# Patient Record
Sex: FEMALE | Race: WHITE
[De-identification: names, ages, dates, MRNs, and addresses within clinical notes are randomized per-mention and may not be internally consistent; named-entity substitution may affect disease eponyms.]

---
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
## 2021-02-13 ENCOUNTER — HOSPITAL ENCOUNTER (EMERGENCY)
Dept: HOSPITAL 76 - ED | Age: 21
LOS: 1 days | Discharge: HOME | End: 2021-02-14
Payer: COMMERCIAL

## 2021-02-13 DIAGNOSIS — S21.119A: ICD-10-CM

## 2021-02-13 DIAGNOSIS — S51.812A: ICD-10-CM

## 2021-02-13 DIAGNOSIS — K92.1: ICD-10-CM

## 2021-02-13 DIAGNOSIS — Z83.79: ICD-10-CM

## 2021-02-13 DIAGNOSIS — S11.91XA: ICD-10-CM
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
## 2021-02-13 DIAGNOSIS — F33.2: Primary | ICD-10-CM

## 2021-02-13 DIAGNOSIS — Z20.822: ICD-10-CM

## 2021-02-13 DIAGNOSIS — R45.851: ICD-10-CM

## 2021-02-13 DIAGNOSIS — R10.12: ICD-10-CM

## 2021-02-13 DIAGNOSIS — F41.9: ICD-10-CM

## 2021-02-13 LAB
ALBUMIN DIAFP-MCNC: 4.9 G/DL (ref 3.2–5.5)
ALBUMIN/GLOB SERPL: 1.5 {RATIO} (ref 1–2.2)
ALP SERPL-CCNC: 45 IU/L (ref 42–121)
ALT SERPL W P-5'-P-CCNC: 16 IU/L (ref 10–60)
AMPHET UR QL SCN: NEGATIVE
ANION GAP SERPL CALCULATED.4IONS-SCNC: 13 MMOL/L (ref 6–13)
APAP SERPL-MCNC: < 10 UG/ML (ref 10–30)
AST SERPL W P-5'-P-CCNC: 20 IU/L (ref 10–42)
BASOPHILS NFR BLD AUTO: 0 10^3/UL (ref 0–0.1)
BASOPHILS NFR BLD AUTO: 0.3 %
BENZODIAZ UR QL SCN: NEGATIVE
BILIRUB BLD-MCNC: 0.9 MG/DL (ref 0.2–1)
BUN SERPL-MCNC: 18 MG/DL (ref 6–20)
C PNEUM DNA NPH QL NAA+NON-PROBE: NOT DETECTED
CALCIUM UR-MCNC: 9.8 MG/DL (ref 8.5–10.3)
CHLORIDE SERPL-SCNC: 99 MMOL/L (ref 101–111)
CLARITY UR REFRACT.AUTO: CLEAR
CO2 SERPL-SCNC: 27 MMOL/L (ref 21–32)
COCAINE UR-SCNC: NEGATIVE UMOL/L
CREAT SERPLBLD-SCNC: 1 MG/DL (ref 0.4–1)
EOSINOPHIL # BLD AUTO: 0.1 10^3/UL (ref 0–0.7)
EOSINOPHIL NFR BLD AUTO: 0.9 %
ERYTHROCYTE [DISTWIDTH] IN BLOOD BY AUTOMATED COUNT: 11.4 % (ref 12–15)
GLOBULIN SER-MCNC: 3.3 G/DL (ref 2.1–4.2)
GLUCOSE SERPL-MCNC: 92 MG/DL (ref 70–100)
GLUCOSE UR QL STRIP.AUTO: NEGATIVE MG/DL
HCG UR QL: NEGATIVE
HGB UR QL STRIP: 14 G/DL (ref 12–16)
KETONES UR QL STRIP.AUTO: NEGATIVE MG/DL
LIPASE SERPL-CCNC: 31 U/L (ref 22–51)
LYMPHOCYTES # SPEC AUTO: 1.8 10^3/UL (ref 1.5–3.5)
LYMPHOCYTES NFR BLD AUTO: 19.8 %
MCH RBC QN AUTO: 32.2 PG (ref 27–31)
MCHC RBC AUTO-ENTMCNC: 33.3 G/DL (ref 32–36)
MCV RBC AUTO: 96.8 FL (ref 81–99)
METHADONE UR QL SCN: NEGATIVE
METHAMPHET UR QL SCN: NEGATIVE
MONOCYTES # BLD AUTO: 0.5 10^3/UL (ref 0–1)
MONOCYTES NFR BLD AUTO: 5.1 %
NEUTROPHILS # BLD AUTO: 6.6 10^3/UL (ref 1.5–6.6)
NEUTROPHILS # SNV AUTO: 8.9 X10^3/UL (ref 4.8–10.8)
NEUTROPHILS NFR BLD AUTO: 73.8 %
NITRITE UR QL STRIP.AUTO: NEGATIVE
OPIATES UR QL SCN: NEGATIVE
PDW BLD AUTO: 9.6 FL (ref 7.9–10.8)
PH UR STRIP.AUTO: 7 PH (ref 5–7.5)
PLATELET # BLD: 252 10^3/UL (ref 130–450)
PROT SPEC-MCNC: 8.2 G/DL (ref 6.7–8.2)
PROT UR STRIP.AUTO-MCNC: NEGATIVE MG/DL
RBC # UR STRIP.AUTO: NEGATIVE /UL
RBC MAR: 4.35 10^6/UL (ref 4.2–5.4)
SALICYLATES SERPL-MCNC: < 6 MG/DL
SP GR UR STRIP.AUTO: 1.01 (ref 1–1.03)
UROBILINOGEN UR QL STRIP.AUTO: (no result) E.U./DL
UROBILINOGEN UR STRIP.AUTO-MCNC: NEGATIVE MG/DL
VOLATILE DRUGS POS SERPL SCN: (no result)

## 2021-02-13 PROCEDURE — 83690 ASSAY OF LIPASE: CPT

## 2021-02-13 PROCEDURE — 83735 ASSAY OF MAGNESIUM: CPT

## 2021-02-13 PROCEDURE — 85025 COMPLETE CBC W/AUTO DIFF WBC: CPT

## 2021-02-13 PROCEDURE — 81003 URINALYSIS AUTO W/O SCOPE: CPT

## 2021-02-13 PROCEDURE — 80329 ANALGESICS NON-OPIOID 1 OR 2: CPT

## 2021-02-13 PROCEDURE — 84443 ASSAY THYROID STIM HORMONE: CPT

## 2021-02-13 PROCEDURE — 80320 DRUG SCREEN QUANTALCOHOLS: CPT

## 2021-02-13 PROCEDURE — 80053 COMPREHEN METABOLIC PANEL: CPT

## 2021-02-13 PROCEDURE — 81001 URINALYSIS AUTO W/SCOPE: CPT

## 2021-02-13 PROCEDURE — 0202U NFCT DS 22 TRGT SARS-COV-2: CPT

## 2021-02-13 PROCEDURE — 99284 EMERGENCY DEPT VISIT MOD MDM: CPT

## 2021-02-13 PROCEDURE — 93005 ELECTROCARDIOGRAM TRACING: CPT

## 2021-02-13 PROCEDURE — 80307 DRUG TEST PRSMV CHEM ANLYZR: CPT

## 2021-02-13 PROCEDURE — 81025 URINE PREGNANCY TEST: CPT

## 2021-02-13 PROCEDURE — 74177 CT ABD & PELVIS W/CONTRAST: CPT

## 2021-02-13 PROCEDURE — 87086 URINE CULTURE/COLONY COUNT: CPT

## 2021-02-13 PROCEDURE — 36415 COLL VENOUS BLD VENIPUNCTURE: CPT

## 2021-02-13 PROCEDURE — 80306 DRUG TEST PRSMV INSTRMNT: CPT

## 2021-02-13 NOTE — ED PHYSICIAN DOCUMENTATION
History of Present Illness





- Stated complaint


Stated Complaint: BLOOD IN STOOL





- Chief complaint


Chief Complaint: Abd Pain





- Additonal information


Additional information: 


20-year-old female presents to the emergency department with 2 concerns.





Her first concern of priority is that she noticed bright red bloody stools this 

morning.  She denies that she had painful bowel movements.  She denies that it 

was blood-streaked stool.  She is concerned because both her mom and sisters 

have a history of Crohn's/colitis and she is concerned that this may be the on 

set of this condition.  She has no fevers, vomiting.  Denies urinary symptoms.  

No pertinent past surgical history.  She denies any history of bloody stools.  

Patient denies aspirin or NSAID use.  She does drink occasionally and has been 

drinking more heavily than normal recently. preceding this a.m.





Her second concern is worsening depression.  She reports that 2 nights ago she 

cut her left arm extensively.  She has suicidal thoughts but denies intent of 

self-harm at this time.  After cutting herself 2 nights ago she did reach out to

her therapist And stated that she felt a little better after cutting but she 

expresses that she feels generally fatigued disinterested in life and 

disassociated with her body.  She has been using cannabis oil as well as 

occasionally smoking to help control her symptoms.  She has never been medicated

for her depression or anxiety.  She does have an appointment with her talk 

therapist this upcoming Monday but is interested in the possibility of starting 

oral medications.





She does endorse a history of alcohol use but has been trying not to drink.  She

reports that when she cut herself 2 nights ago she had been drinking heavily.





Pt does not have an established PCP








Review of Systems


Constitutional: denies: Fever, Chills


Eyes: reports: Reviewed and negative


Ears: reports: Reviewed and negative


Nose: reports: Reviewed and negative


Throat: reports: Reviewed and negative


Cardiac: reports: Reviewed and negative


Respiratory: reports: Reviewed and negative


GI: reports: Abdominal Pain, Nausea, Bloody / black stool.  denies: Vomiting, 

Constipation, Diarrhea


: denies: Dysuria, Frequency, Hesitancy


Skin: reports: Lesions (Multiple shallow cutting lesions in various stages of 

healing on the left forearm chest and left neck.).  denies: Rash


Musculoskeletal: reports: Reviewed and negative


Neurologic: reports: Reviewed and negative


Psychiatric: reports: Depressed, Anxiety.  denies: Suicidal (Suicidal thoughts 

but no active plan.), Homicidal, Hallucinations, Insomnia


Endocrine: reports: Reviewed and negative





PD PAST MEDICAL HISTORY





- Past Surgical History


Past Surgical History: No





- Present Medications


Home Medications: 


                                Ambulatory Orders











 Medication  Instructions  Recorded  Confirmed


 


Pantoprazole [Protonix] 0 mg PO AC #30 02/13/21 














- Allergies


Allergies/Adverse Reactions: 


                                    Allergies











Allergy/AdvReac Type Severity Reaction Status Date / Time


 


No Known Drug Allergies Allergy   Verified 02/13/21 12:30














- Social History


Does the pt smoke?: No


Smoking Status: Never smoker


Does the pt drink ETOH?: No


Does the pt have substance abuse?: No





PD ED PE EXPANDED





- General


General: Alert, No acute distress, Well developed/nourished





- HEENT


HEENT: Atraumatic, PERRL





- Neck


Neck: Supple w/out meningeal sx, Other (multiple shallow cutting lesion left 

neck).  No: Adenopathy





- Cardiac


Cardiac: Regular Rate, Regular Rhythm, Radial strong equal, Pedal strong equal, 

Cap refill < 2 sec





- Respiratory


Respiratory: Clear to ausultation elida.  No: Distress, Labored





- Abdomen


Abdomen: Normal Bowel sounds, Tender to palpation (LUQ without guarding or 

rebound)





- Rectal


Rectal: Normal Tone, Chaperone present, Other (Digital rectal exam revealed a 

small amount of lul hematochezia)





- Derm


Derm: Normal color, Warm and dry, Other (Multiple shallow cutting lesions left 

forearm neck and chest in various stages of healing.  No surrounding erythema 

drainage or signs of infection.).  No: Pale





- Neuro


Neuro: Alert and Oriented X 3, CNII-XII intact





- GCS


Eye Opening: Spontaneous


Motor: Obeys Commands


Verbal: Oriented


Total: 15





- Psych


Psych: Anxious.  No: Suicidal, Homicidal





Results





- Vitals


Vitals: 


                               Vital Signs - 24 hr











  02/13/21 02/13/21 02/13/21





  12:25 13:18 15:40


 


Temperature 36.5 C 37.0 C 36.8 C


 


Heart Rate 63 48 L 55 L


 


Respiratory 16 18 13





Rate   


 


Blood Pressure 123/82 H 114/80 114/74


 


O2 Saturation 100 99 100














  02/13/21 02/13/21





  17:48 19:30


 


Temperature 36.8 C 37.2 C


 


Heart Rate 71 57 L


 


Respiratory 12 16





Rate  


 


Blood Pressure 106/65 126/85 H


 


O2 Saturation 100 100








                                     Oxygen











O2 Source                      Room air

















- Labs


Labs: 


                                Laboratory Tests











  02/13/21 02/13/21 02/13/21





  13:00 13:00 13:13


 


WBC    8.9


 


RBC    4.35


 


Hgb    14.0


 


Hct    42.1


 


MCV    96.8


 


MCH    32.2 H


 


MCHC    33.3


 


RDW    11.4 L


 


Plt Count    252


 


MPV    9.6


 


Neut # (Auto)    6.6


 


Lymph # (Auto)    1.8


 


Mono # (Auto)    0.5


 


Eos # (Auto)    0.1


 


Baso # (Auto)    0.0


 


Absolute Nucleated RBC    0.00


 


Nucleated RBC %    0.0


 


Sodium   


 


Potassium   


 


Chloride   


 


Carbon Dioxide   


 


Anion Gap   


 


BUN   


 


Creatinine   


 


Estimated GFR (MDRD)   


 


Glucose   


 


Calcium   


 


Magnesium   


 


Total Bilirubin   


 


AST   


 


ALT   


 


Alkaline Phosphatase   


 


Total Protein   


 


Albumin   


 


Globulin   


 


Albumin/Globulin Ratio   


 


Lipase   


 


TSH   


 


Urine Color  YELLOW  


 


Urine Clarity  CLEAR  


 


Urine pH  7.0  


 


Ur Specific Gravity  1.010  


 


Urine Protein  NEGATIVE  


 


Urine Glucose (UA)  NEGATIVE  


 


Urine Ketones  NEGATIVE  


 


Urine Occult Blood  NEGATIVE  


 


Urine Nitrite  NEGATIVE  


 


Urine Bilirubin  NEGATIVE  


 


Urine Urobilinogen  0.2 (NORMAL)  


 


Ur Leukocyte Esterase  NEGATIVE  


 


Ur Microscopic Review  NOT INDICATED  


 


Urine Culture Comments  NOT INDICATED  


 


Urine HCG, Qual   NEGATIVE 


 


Nasal Adenovirus (PCR)   


 


Nasal B. parapertussis DNA (PCR)   


 


Nasal Coronavir 229E PCR   


 


Nasal Coronavir HKU1 PCR   


 


Nasal Coronavir NL63 PCR   


 


Nasal Coronavir OC43 PCR   


 


Nasal Enterovir/Rhinovir PCR   


 


Nasal Influenza B PCR   


 


Nasal Influenza A PCR   


 


Nasal Parainfluen 1 PCR   


 


Nasal Parainfluen 2 PCR   


 


Nasal Parainfluen 3 PCR   


 


Nasal Parainfluen 4 PCR   


 


Nasal RSV (PCR)   


 


Nasal B.pertussis DNA PCR   


 


Nasal C.pneumoniae (PCR)   


 


Corona Human Metapneumo PCR   


 


Nasal M.pneumoniae (PCR)   


 


Nasal SARS-CoV-2 (PCR)   


 


Salicylates   


 


Urine Opiates Screen   NEGATIVE 


 


Ur Oxycodone Screen   NEGATIVE 


 


Urine Methadone Screen   NEGATIVE 


 


Ur Propoxyphene Screen   NEGATIVE 


 


Acetaminophen   


 


Ur Barbiturates Screen   NEGATIVE 


 


Ur Tricyclics Screen   NEGATIVE 


 


Ur Phencyclidine Scrn   NEGATIVE 


 


Ur Amphetamine Screen   NEGATIVE 


 


U Methamphetamines Scrn   NEGATIVE 


 


U Benzodiazepines Scrn   NEGATIVE 


 


Urine Cocaine Screen   NEGATIVE 


 


U Cannabinoids Screen   NEGATIVE 


 


Ethyl Alcohol   














  02/13/21 02/13/21 02/13/21





  13:13 13:13 13:13


 


WBC   


 


RBC   


 


Hgb   


 


Hct   


 


MCV   


 


MCH   


 


MCHC   


 


RDW   


 


Plt Count   


 


MPV   


 


Neut # (Auto)   


 


Lymph # (Auto)   


 


Mono # (Auto)   


 


Eos # (Auto)   


 


Baso # (Auto)   


 


Absolute Nucleated RBC   


 


Nucleated RBC %   


 


Sodium  139  


 


Potassium  3.7  


 


Chloride  99 L  


 


Carbon Dioxide  27  


 


Anion Gap  13.0  


 


BUN  18  


 


Creatinine  1.0  


 


Estimated GFR (MDRD)  69 L  


 


Glucose  92  


 


Calcium  9.8  


 


Magnesium    2.1


 


Total Bilirubin  0.9  


 


AST  20  


 


ALT  16  


 


Alkaline Phosphatase  45  


 


Total Protein  8.2  


 


Albumin  4.9  


 


Globulin  3.3  


 


Albumin/Globulin Ratio  1.5  


 


Lipase  31  


 


TSH   0.71 


 


Urine Color   


 


Urine Clarity   


 


Urine pH   


 


Ur Specific Gravity   


 


Urine Protein   


 


Urine Glucose (UA)   


 


Urine Ketones   


 


Urine Occult Blood   


 


Urine Nitrite   


 


Urine Bilirubin   


 


Urine Urobilinogen   


 


Ur Leukocyte Esterase   


 


Ur Microscopic Review   


 


Urine Culture Comments   


 


Urine HCG, Qual   


 


Nasal Adenovirus (PCR)   


 


Nasal B. parapertussis DNA (PCR)   


 


Nasal Coronavir 229E PCR   


 


Nasal Coronavir HKU1 PCR   


 


Nasal Coronavir NL63 PCR   


 


Nasal Coronavir OC43 PCR   


 


Nasal Enterovir/Rhinovir PCR   


 


Nasal Influenza B PCR   


 


Nasal Influenza A PCR   


 


Nasal Parainfluen 1 PCR   


 


Nasal Parainfluen 2 PCR   


 


Nasal Parainfluen 3 PCR   


 


Nasal Parainfluen 4 PCR   


 


Nasal RSV (PCR)   


 


Nasal B.pertussis DNA PCR   


 


Nasal C.pneumoniae (PCR)   


 


Corona Human Metapneumo PCR   


 


Nasal M.pneumoniae (PCR)   


 


Nasal SARS-CoV-2 (PCR)   


 


Salicylates  < 6.0  


 


Urine Opiates Screen   


 


Ur Oxycodone Screen   


 


Urine Methadone Screen   


 


Ur Propoxyphene Screen   


 


Acetaminophen  < 10 L  


 


Ur Barbiturates Screen   


 


Ur Tricyclics Screen   


 


Ur Phencyclidine Scrn   


 


Ur Amphetamine Screen   


 


U Methamphetamines Scrn   


 


U Benzodiazepines Scrn   


 


Urine Cocaine Screen   


 


U Cannabinoids Screen   


 


Ethyl Alcohol  < 5.0  














  02/13/21





  18:30


 


WBC 


 


RBC 


 


Hgb 


 


Hct 


 


MCV 


 


MCH 


 


MCHC 


 


RDW 


 


Plt Count 


 


MPV 


 


Neut # (Auto) 


 


Lymph # (Auto) 


 


Mono # (Auto) 


 


Eos # (Auto) 


 


Baso # (Auto) 


 


Absolute Nucleated RBC 


 


Nucleated RBC % 


 


Sodium 


 


Potassium 


 


Chloride 


 


Carbon Dioxide 


 


Anion Gap 


 


BUN 


 


Creatinine 


 


Estimated GFR (MDRD) 


 


Glucose 


 


Calcium 


 


Magnesium 


 


Total Bilirubin 


 


AST 


 


ALT 


 


Alkaline Phosphatase 


 


Total Protein 


 


Albumin 


 


Globulin 


 


Albumin/Globulin Ratio 


 


Lipase 


 


TSH 


 


Urine Color 


 


Urine Clarity 


 


Urine pH 


 


Ur Specific Gravity 


 


Urine Protein 


 


Urine Glucose (UA) 


 


Urine Ketones 


 


Urine Occult Blood 


 


Urine Nitrite 


 


Urine Bilirubin 


 


Urine Urobilinogen 


 


Ur Leukocyte Esterase 


 


Ur Microscopic Review 


 


Urine Culture Comments 


 


Urine HCG, Qual 


 


Nasal Adenovirus (PCR)  NOT DETECTED


 


Nasal B. parapertussis DNA (PCR)  NOT DETECTED


 


Nasal Coronavir 229E PCR  NOT DETECTED


 


Nasal Coronavir HKU1 PCR  NOT DETECTED


 


Nasal Coronavir NL63 PCR  NOT DETECTED


 


Nasal Coronavir OC43 PCR  NOT DETECTED


 


Nasal Enterovir/Rhinovir PCR  NOT DETECTED


 


Nasal Influenza B PCR  NOT DETECTED


 


Nasal Influenza A PCR  NOT DETECTED


 


Nasal Parainfluen 1 PCR  NOT DETECTED


 


Nasal Parainfluen 2 PCR  NOT DETECTED


 


Nasal Parainfluen 3 PCR  NOT DETECTED


 


Nasal Parainfluen 4 PCR  NOT DETECTED


 


Nasal RSV (PCR)  NOT DETECTED


 


Nasal B.pertussis DNA PCR  NOT DETECTED


 


Nasal C.pneumoniae (PCR)  NOT DETECTED


 


Corona Human Metapneumo PCR  NOT DETECTED


 


Nasal M.pneumoniae (PCR)  NOT DETECTED


 


Nasal SARS-CoV-2 (PCR)  NOT DETECTED


 


Salicylates 


 


Urine Opiates Screen 


 


Ur Oxycodone Screen 


 


Urine Methadone Screen 


 


Ur Propoxyphene Screen 


 


Acetaminophen 


 


Ur Barbiturates Screen 


 


Ur Tricyclics Screen 


 


Ur Phencyclidine Scrn 


 


Ur Amphetamine Screen 


 


U Methamphetamines Scrn 


 


U Benzodiazepines Scrn 


 


Urine Cocaine Screen 


 


U Cannabinoids Screen 


 


Ethyl Alcohol 














- Rads (name of study)


  ** CT abd


Radiology: Final report received (Nondilated fluid-filled loops of the small 

bowel are seen throughout the abdomen which are nonspecific but may indicate a 

mild enteritis.  No signs of bowel obstruction.  Normal appendix.)





PD MEDICAL DECISION MAKING





- ED course


Complexity details: reviewed results, re-evaluated patient, considered 

differential, d/w patient


ED course: 


20-year-old female presents emergency department for 2 concerns.  





The first is that she developed bright red bloody stools this a.m.  She does 

have a family history of Crohn's or colitis and is concerned she may be 

developing that.  Here in the emergency department her vital signs and labs are 

essentially.  No leukocytosis and a very healthy hemoglobin.  A CT of the 

abdomen was completed and it does show multiple fluid-filled loops of bowel that

 may be assistant with an enteritis.





I did do a digital rectal exam on this patient and there was a small amount of 

hematochezia on the gloved finger.  I discussed the CT findings with the patient

 and discussed that moving forward she will need to be seen by a 

gastroenterologist to undergo colonoscopy as that is the definitive testing for 

Crohn's colitis.  I will recommend that she avoid any NSAID use and we will 

start her on twice daily Protonix. We did discuss that if she is having 

worsening rectal bleeding, feels faint or dizzy, has a resting heart rate 

greater than 115, has any fainting episode she is to return immediately to the 

emergency department.








The second concern for this young lady is her depression and anxiety.  She has 

been self cutting recently but is fairly adamant with this provider that she 

does not want to harm herself or others.  She has found some symptom relief by 

using talk therapy and has stopped smoking cannabis and drinking.  However I 

suspect that she will do well with oral medication.  Therefore I requested 

telepsych consultation





1830:  I have spoken with telepsych psychiatrist Dr. Zavala who has assessed 

the patient.  Unfortunately she gave a much more worrisome history for suicidal 

behaviors.  She expressed to him that she has attempted suicide in the past by 

taking too many sleeping pills.  She also has thoughts of jumping off a bridge 

or in front of traffic.  She expressed to the psychiatrist that these thoughts 

are becoming pervasive and overwhelming.  At this time he feels that the patient

 should be voluntarily placed in psychiatric facility for mood stabilization.  

He expresses to this provider that if she does not wish to go in voluntarily he 

would request DCR evaluation for involuntary status.





I spent some time at the bedside with the patient.  We discussed that what she 

reported to me and the psychiatrist were quite different and she agreed that 

they were.  She does feel that she minimized her initial thoughts to me.  We 

discussed that the big concern is that she would be at risk to harm herself 

leaving the hospital.  At this time she agrees to be placed in a psychiatric 

facility voluntarily.  I discussed with her that if her decision would change I 

would request DCR involvement though there is no guarantee that DCR would make 

her an involuntary placement.





2100:  Patient has been accepted at [facility] for further 

psychiatric evaluation and treatment.  Appropriate COBRA paperwork as well as 

authorization of secure ambulance transportation to and from behavioral health 

services form completed








Departure





- Departure


Disposition: [facility] DC/Xfer


Clinical Impression: 


 Bloody stools





Depression


Qualifiers:


 Depression Type: major depressive disorder Major depression recurrence: 

unspecified whether recurrent Active/Remission status: currently active Major 

depression episode severity: severe Psychotic features: without psychotic 

features Qualified Code(s): F32.2 - Major depressive disorder, single episode, 

severe without psychotic features





Condition: Stable


Record reviewed to determine appropriate education?: Yes


Prescriptions: 


Pantoprazole [Protonix] 0 mg PO AC #30


Comments: 


I wish you luck in your journey.  Please be patient withotuself.  the road to 

[facility] takes time, but you can get there.  





As we discussed the CT scan of your belly did suggest an enteritis or mild 

inflammation of the small bowel. Is not clear at this time if this is simply an 

early viral illness or if this is the first time presentation for possible 

Crohn's or colitis.





Once out of hospitalization for depression it is important that you schedule 

follow-up appointment with a primary care provider. You need referral to a 

gastroenterologist in order to undergo endoscopy or colonoscopy.





I would like you to start taking Protonix once daily. Please avoid any NSAID 

medication use such as ibuprofen. Also avoid all alcohol use.





If at any point you develop a racing heart at rest, have suddenly severe belly p

ain, have severe or worsening rectal bleeding or feel that your symptoms are not

 improving please return immediately to the emergency department

## 2021-02-13 NOTE — CT REPORT
PROCEDURE:  Abdomen/Pelvis W

 

INDICATIONS:  hematochezia

 

CONTRAST:  IV CONTRAST: Optiray 320 ml: 100 PO CONTRAST: *NO PO CONTRAST 

 

TECHNIQUE:  

After the administration of intravenous contrast, 5 mm thick sections acquired from the diaphragms to
 the symphysis.  5 mm thick coronal and sagittal reformats were acquired.  For radiation dose reducti
on, the following was used:  automated exposure control, adjustment of mA and/or kV according to evelio
ent size.  

 

COMPARISON:  None.

 

FINDINGS:  

Image quality:  Excellent.  

 

ABDOMEN:  

Lung bases: There is mild dependent atelectasis in the lung bases.  Heart size is normal.  

 

Solid organs:  Liver and spleen are normal in size and enhancement. A 6 mm hypoattenuating lesion in 
the right hepatic lobe is too small to characterize, but most likely represents a cyst. Gallbladder a
ppears normal.  Biliary system is non dilated.  Pancreas enhances normally.  No adrenal nodules.  Kid
neys demonstrate normal size and enhancement, without hydronephrosis.  

 

Peritoneum and bowel:  Normal appendix. No signs of bowel obstruction. A small amount of stool is see
n throughout the colon. Nondilated fluid-filled loops of small bowel are seen throughout the abdomen,
 which are nonspecific, but could indicate a mild enteritis. There is no significant ascites or pneum
operitoneum. 

 

Nodes and vessels:  No retroperitoneal or mesenteric adenopathy by size criteria.  Aorta and inferior
 vena cava are normal in size.  

 

Miscellaneous:  No ventral hernias.  

 

 

PELVIS:  

Genitourinary:  Bladder wall thickness is normal.  The uterus is normal in size. An intrauterine karan
ce is noted. No suspicious adnexal mass is identified.

 

Miscellaneous:  No inguinal hernias or adenopathy.  

 

Bones:  No suspicious bony lesions.  No vertebral body compression fractures.  

 

IMPRESSION:  

Nondilated fluid-filled loops of small bowel are seen throughout the abdomen, which are nonspecific b
ut may indicate a mild enteritis. No signs of bowel obstruction. Normal appendix. 

 

Reviewed by: Maged Pickard MD on 2/13/2021 2:23 PM PST

Approved by: Maged Pickard MD on 2/13/2021 2:23 PM PST

 

 

Station ID:  [id number]

## 2021-02-14 VITALS — SYSTOLIC BLOOD PRESSURE: 120 MMHG | DIASTOLIC BLOOD PRESSURE: 72 MMHG
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
## 2022-10-25 ENCOUNTER — HOSPITAL ENCOUNTER (OUTPATIENT)
Dept: HOSPITAL 76 - LAB.S | Age: 22
Discharge: HOME | End: 2022-10-25
Payer: MEDICAID

## 2022-10-25 DIAGNOSIS — K59.00: ICD-10-CM

## 2022-10-25 DIAGNOSIS — R53.83: ICD-10-CM

## 2022-10-25 DIAGNOSIS — F32.A: Primary | ICD-10-CM

## 2022-10-25 LAB
ALBUMIN DIAFP-MCNC: 4.8 G/DL (ref 3.2–5.5)
ALBUMIN/GLOB SERPL: 1.5 {RATIO} (ref 1–2.2)
ALP SERPL-CCNC: 38 IU/L (ref 42–121)
ALT SERPL W P-5'-P-CCNC: 16 IU/L (ref 10–60)
ANION GAP SERPL CALCULATED.4IONS-SCNC: 11 MMOL/L (ref 6–13)
AST SERPL W P-5'-P-CCNC: 18 IU/L (ref 10–42)
BASOPHILS NFR BLD AUTO: 0.1 10^3/UL (ref 0–0.1)
BASOPHILS NFR BLD AUTO: 1 %
BILIRUB BLD-MCNC: 0.6 MG/DL (ref 0.2–1)
BUN SERPL-MCNC: 16 MG/DL (ref 6–20)
CALCIUM UR-MCNC: 9.9 MG/DL (ref 8.5–10.3)
CHLORIDE SERPL-SCNC: 102 MMOL/L (ref 101–111)
CO2 SERPL-SCNC: 28 MMOL/L (ref 21–32)
CREAT SERPLBLD-SCNC: 0.8 MG/DL (ref 0.4–1)
EOSINOPHIL # BLD AUTO: 0.1 10^3/UL (ref 0–0.7)
EOSINOPHIL NFR BLD AUTO: 1.8 %
ERYTHROCYTE [DISTWIDTH] IN BLOOD BY AUTOMATED COUNT: 11.6 % (ref 12–15)
FERRITIN SERPL-MCNC: 61.8 NG/ML (ref 11–306.8)
GFRSERPLBLD MDRD-ARVRAT: 88 ML/MIN/{1.73_M2} (ref 89–?)
GLOBULIN SER-MCNC: 3.3 G/DL (ref 2.1–4.2)
GLUCOSE SERPL-MCNC: 85 MG/DL (ref 70–100)
HCT VFR BLD AUTO: 42.9 % (ref 37–47)
HGB UR QL STRIP: 13.8 G/DL (ref 12–16)
IRON SATN MFR SERPL: 25 % (ref 20–50)
IRON SERPL-MCNC: 74 UG/DL (ref 28–170)
LYMPHOCYTES # SPEC AUTO: 1.5 10^3/UL (ref 1.5–3.5)
LYMPHOCYTES NFR BLD AUTO: 29.9 %
MCH RBC QN AUTO: 30.7 PG (ref 27–31)
MCHC RBC AUTO-ENTMCNC: 32.2 G/DL (ref 32–36)
MCV RBC AUTO: 95.3 FL (ref 81–99)
MONOCYTES # BLD AUTO: 0.4 10^3/UL (ref 0–1)
MONOCYTES NFR BLD AUTO: 8.4 %
NEUTROPHILS # BLD AUTO: 2.9 10^3/UL (ref 1.5–6.6)
NEUTROPHILS # SNV AUTO: 4.9 X10^3/UL (ref 4.8–10.8)
NEUTROPHILS NFR BLD AUTO: 58.7 %
NRBC # BLD AUTO: 0 /100WBC
NRBC # BLD AUTO: 0 X10^3/UL
PDW BLD AUTO: 10.1 FL (ref 7.9–10.8)
PLATELET # BLD: 257 10^3/UL (ref 130–450)
POTASSIUM SERPL-SCNC: 4.2 MMOL/L (ref 3.5–5)
PROT SPEC-MCNC: 8.1 G/DL (ref 6.7–8.2)
RBC MAR: 4.5 10^6/UL (ref 4.2–5.4)
SODIUM SERPLBLD-SCNC: 141 MMOL/L (ref 135–145)
T3FREE SERPL-MCNC: 3.38 PG/ML (ref 2.5–3.9)
T4 FREE SERPL-MCNC: 0.96 NG/DL (ref 0.58–1.64)
TIBC SERPL-MCNC: 294 UG/DL (ref 250–450)
TRANSFERRIN SERPL-MCNC: 210 MG/DL (ref 192–382)
TSH SERPL-ACNC: 1.21 UIU/ML (ref 0.34–5.6)

## 2022-10-25 PROCEDURE — 84439 ASSAY OF FREE THYROXINE: CPT

## 2022-10-25 PROCEDURE — 84481 FREE ASSAY (FT-3): CPT

## 2022-10-25 PROCEDURE — 36415 COLL VENOUS BLD VENIPUNCTURE: CPT

## 2022-10-25 PROCEDURE — 84466 ASSAY OF TRANSFERRIN: CPT

## 2022-10-25 PROCEDURE — 80053 COMPREHEN METABOLIC PANEL: CPT

## 2022-10-25 PROCEDURE — 84443 ASSAY THYROID STIM HORMONE: CPT

## 2022-10-25 PROCEDURE — 83540 ASSAY OF IRON: CPT

## 2022-10-25 PROCEDURE — 82728 ASSAY OF FERRITIN: CPT

## 2022-10-25 PROCEDURE — 85025 COMPLETE CBC W/AUTO DIFF WBC: CPT
